# Patient Record
Sex: MALE | Race: WHITE | NOT HISPANIC OR LATINO | Employment: FULL TIME | URBAN - METROPOLITAN AREA
[De-identification: names, ages, dates, MRNs, and addresses within clinical notes are randomized per-mention and may not be internally consistent; named-entity substitution may affect disease eponyms.]

---

## 2024-04-25 ENCOUNTER — OFFICE VISIT (OUTPATIENT)
Dept: OPHTHALMOLOGY | Facility: CLINIC | Age: 24
End: 2024-04-25
Payer: COMMERCIAL

## 2024-04-25 DIAGNOSIS — H02.889 MEIBOMIAN GLAND DYSFUNCTION: Primary | ICD-10-CM

## 2024-04-25 DIAGNOSIS — H52.7 REFRACTIVE ERROR: ICD-10-CM

## 2024-04-25 PROCEDURE — 92015 DETERMINE REFRACTIVE STATE: CPT | Performed by: OPHTHALMOLOGY

## 2024-04-25 PROCEDURE — 99203 OFFICE O/P NEW LOW 30 MIN: CPT | Performed by: OPHTHALMOLOGY

## 2024-04-25 PROCEDURE — 99213 OFFICE O/P EST LOW 20 MIN: CPT | Performed by: OPHTHALMOLOGY

## 2024-04-25 ASSESSMENT — CUP TO DISC RATIO
OD_RATIO: 0.3
OS_RATIO: 0.3

## 2024-04-25 ASSESSMENT — ENCOUNTER SYMPTOMS
MUSCULOSKELETAL NEGATIVE: 0
CARDIOVASCULAR NEGATIVE: 0
NEUROLOGICAL NEGATIVE: 0
OCCASIONAL FEELINGS OF UNSTEADINESS: 0
ENDOCRINE NEGATIVE: 0
HEMATOLOGIC/LYMPHATIC NEGATIVE: 0
PSYCHIATRIC NEGATIVE: 0
GASTROINTESTINAL NEGATIVE: 0
ALLERGIC/IMMUNOLOGIC NEGATIVE: 0
DEPRESSION: 0
EYES NEGATIVE: 0
CONSTITUTIONAL NEGATIVE: 0
RESPIRATORY NEGATIVE: 0
LOSS OF SENSATION IN FEET: 0

## 2024-04-25 ASSESSMENT — REFRACTION_MANIFEST
OS_AXIS: 010
OD_SPHERE: -1.00
OS_CYLINDER: -0.75
OD_AXIS: 175
OS_SPHERE: -0.75
OD_CYLINDER: -0.50
METHOD_AUTOREFRACTION: 1

## 2024-04-25 ASSESSMENT — VISUAL ACUITY
CORRECTION_TYPE: GLASSES
OD_SC: 20/20
OS_SC+: -1
OS_SC: 20/20
METHOD: SNELLEN - SINGLE

## 2024-04-25 ASSESSMENT — PATIENT HEALTH QUESTIONNAIRE - PHQ9
1. LITTLE INTEREST OR PLEASURE IN DOING THINGS: NOT AT ALL
2. FEELING DOWN, DEPRESSED OR HOPELESS: NOT AT ALL
SUM OF ALL RESPONSES TO PHQ9 QUESTIONS 1 AND 2: 0

## 2024-04-25 ASSESSMENT — KERATOMETRY
OS_AXISANGLE_DEGREES: 100
OD_AXISANGLE_DEGREES: 90
OS_K2POWER_DIOPTERS: 43.25
OS_AXISANGLE2_DEGREES: 10
OD_AXISANGLE2_DEGREES: 180
OD_K2POWER_DIOPTERS: 43.00
METHOD_AUTO_MANUAL: AUTOMATED
OS_K1POWER_DIOPTERS: 42.25
OD_K1POWER_DIOPTERS: 42.25

## 2024-04-25 ASSESSMENT — EXTERNAL EXAM - RIGHT EYE: OD_EXAM: NORMAL

## 2024-04-25 ASSESSMENT — EXTERNAL EXAM - LEFT EYE: OS_EXAM: NORMAL

## 2024-04-25 ASSESSMENT — TONOMETRY
IOP_METHOD: GOLDMANN APPLANATION
OD_IOP_MMHG: 16
OS_IOP_MMHG: 16

## 2024-04-25 ASSESSMENT — REFRACTION_WEARINGRX
OS_CYLINDER: -0.75
OD_AXIS: 010
OS_SPHERE: -1.00
OD_SPHERE: -1.50
SPECS_TYPE: SVL
OS_AXIS: 008
OD_CYLINDER: -0.50

## 2024-04-25 ASSESSMENT — PAIN SCALES - GENERAL: PAINLEVEL: 0-NO PAIN

## 2024-04-25 ASSESSMENT — SLIT LAMP EXAM - LIDS
COMMENTS: MEIBOMIAN GLAND DYSFUNCTION
COMMENTS: MEIBOMIAN GLAND DYSFUNCTION

## 2024-04-25 NOTE — PROGRESS NOTES
Assessment/Plan   Problem List Items Addressed This Visit       Meibomian gland dysfunction - Primary     Slight meibomian gland dysfunction (MGD) tendency but asymptomatic. Will monitor for lid issues or surface discomfort. To call if any issues.          Refractive error     Discussed glasses prescription from refraction. Will provide if patient interested in keeping for records or to fill as a new set of glasses.               Provided reassurance regarding above diagnoses and care received in the office visit today. Discussed outcomes and options along with the importance of treatment compliance. Understands the importance of any follow up visits. Patient instructed to call/communicate with our office if any new issues, questions, or concerns.     Will plan to see back in 1 year full or sooner PRN

## 2024-04-25 NOTE — PATIENT INSTRUCTIONS
Thank you so much for choosing me to provide your care today!    If you were dilated your vision may remain blurry   or light sensitive for several hours.    The nature of eye and vision problems can require frequent follow up, please make every effort to adhere to any future appointments.    If you have any issues, questions, or concerns,   please do not hesitate to reach out.    If you receive a survey in regards to your care today, please mention any exceptional care my office staff and/or technicians provided.    You can reach our office at this number:  [phone]

## 2024-04-25 NOTE — ASSESSMENT & PLAN NOTE
Slight meibomian gland dysfunction (MGD) tendency but asymptomatic. Will monitor for lid issues or surface discomfort. To call if any issues.

## 2025-01-01 ENCOUNTER — HOSPITAL ENCOUNTER (EMERGENCY)
Facility: HOSPITAL | Age: 25
Discharge: HOME | End: 2025-01-01
Attending: STUDENT IN AN ORGANIZED HEALTH CARE EDUCATION/TRAINING PROGRAM
Payer: COMMERCIAL

## 2025-01-01 VITALS
BODY MASS INDEX: 42.66 KG/M2 | TEMPERATURE: 97 F | WEIGHT: 315 LBS | SYSTOLIC BLOOD PRESSURE: 150 MMHG | RESPIRATION RATE: 18 BRPM | HEIGHT: 72 IN | OXYGEN SATURATION: 98 % | DIASTOLIC BLOOD PRESSURE: 78 MMHG | HEART RATE: 93 BPM

## 2025-01-01 DIAGNOSIS — Z71.1 PHYSICALLY WELL BUT WORRIED: ICD-10-CM

## 2025-01-01 DIAGNOSIS — Z77.21 EXPOSURE TO BODY FLUID: Primary | ICD-10-CM

## 2025-01-01 LAB
HAV IGM SER QL: NONREACTIVE
HBV CORE IGM SER QL: NONREACTIVE
HBV SURFACE AG SERPL QL IA: NONREACTIVE
HCV AB SER QL: NONREACTIVE
HIV 1+2 AB+HIV1 P24 AG SERPL QL IA: NONREACTIVE

## 2025-01-01 PROCEDURE — 87389 HIV-1 AG W/HIV-1&-2 AB AG IA: CPT | Mod: TRILAB | Performed by: STUDENT IN AN ORGANIZED HEALTH CARE EDUCATION/TRAINING PROGRAM

## 2025-01-01 PROCEDURE — 36415 COLL VENOUS BLD VENIPUNCTURE: CPT | Performed by: STUDENT IN AN ORGANIZED HEALTH CARE EDUCATION/TRAINING PROGRAM

## 2025-01-01 PROCEDURE — 80074 ACUTE HEPATITIS PANEL: CPT | Mod: TRILAB | Performed by: STUDENT IN AN ORGANIZED HEALTH CARE EDUCATION/TRAINING PROGRAM

## 2025-01-01 PROCEDURE — 99283 EMERGENCY DEPT VISIT LOW MDM: CPT | Performed by: STUDENT IN AN ORGANIZED HEALTH CARE EDUCATION/TRAINING PROGRAM

## 2025-01-01 ASSESSMENT — COLUMBIA-SUICIDE SEVERITY RATING SCALE - C-SSRS
1. IN THE PAST MONTH, HAVE YOU WISHED YOU WERE DEAD OR WISHED YOU COULD GO TO SLEEP AND NOT WAKE UP?: NO
6. HAVE YOU EVER DONE ANYTHING, STARTED TO DO ANYTHING, OR PREPARED TO DO ANYTHING TO END YOUR LIFE?: NO
2. HAVE YOU ACTUALLY HAD ANY THOUGHTS OF KILLING YOURSELF?: NO

## 2025-01-01 ASSESSMENT — PAIN SCALES - GENERAL: PAINLEVEL_OUTOF10: 0 - NO PAIN

## 2025-01-01 ASSESSMENT — PAIN - FUNCTIONAL ASSESSMENT: PAIN_FUNCTIONAL_ASSESSMENT: 0-10
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
## 2025-01-01 NOTE — DISCHARGE INSTRUCTIONS
Thank you for allowing myself and the team to take care of you during your emergency situation and addressing your health concerns.    You were evaluated for exposure to bodily fluids.     Your likely condition/diagnosis: Low concern for inoculation of infectious pathogen    During your visit in the emergency department you were evaluated for your presenting symptoms.  Based on the extensive workup you received,  all efforts were made to identify the source of your symptoms and identify any life-threatening conditions.  These life-threatening conditions that were attempted to be identified and medically managed/treated include but not limited to none.  Additionally, you may be experiencing symptoms that are non-life-threatening but are uncomfortable and disruptive to your everyday life.  All efforts were made to manage your symptoms while in the emergency department along with appropriate at home therapy for symptomatic management during your recovery. Please be aware that not all of the conditions explained/discussed in these discharge instructions are applicable to your condition but encompass many of the conditions that were evaluated for during your ED encounter.      During your evaluation and assessment, all measures were taken to evaluate you and address your health concerns to identify dangerous and life threatening health conditions. It is not possible to identify all health conditions or pathologies and eliminate any chance of unfavorable outcomes while in the Emergency Department. My team encourages you to be vigilant with your health and follow-up with the appropriate providers outlined in your discharge paperwork. Please return to the Emergency Department if you feel that your health has not improved or experiencing worsening symptoms.    Special instructions please follow your results on [employer].  Please make follow-up appoint with your primary care physician to further manage symptoms address your  health concerns.    Incidental findings:  None

## 2025-01-02 ENCOUNTER — DOCUMENTATION (OUTPATIENT)
Dept: EMERGENCY MEDICINE | Facility: HOSPITAL | Age: 25
End: 2025-01-02
Payer: COMMERCIAL

## 2025-01-02 NOTE — PROGRESS NOTES
1/2/2025     Test:   Discussed HIV and HCV testing with the patient in the ED.   Patient received HIV and HCV test today    Test Result:  HIV Negative  HCV Negative  SYPHILIS Indeterminate    Result notification:  Patient was notified of their test results on 01/02/25 via Telephone (after verifying 3 identifiers)    Follow-up plan:   Patient was referred to Other on [Date]  Patient has appointment at [clinic name] on [Date]  Barriers to attending appointment: [free text, none]  Missed appointments: [unfilled]     Signed  JESSICA Romo   HIV/HCV [facility]  Certified Community Health Worker - Research  Please contact me via email or Secure Chat or phone [phone] with questions

## 2025-02-12 ENCOUNTER — OFFICE VISIT (OUTPATIENT)
Dept: URGENT CARE | Age: 25
End: 2025-02-12
Payer: COMMERCIAL

## 2025-02-12 VITALS
DIASTOLIC BLOOD PRESSURE: 92 MMHG | SYSTOLIC BLOOD PRESSURE: 137 MMHG | OXYGEN SATURATION: 99 % | HEART RATE: 91 BPM | HEIGHT: 72 IN | BODY MASS INDEX: 41.61 KG/M2 | WEIGHT: 307.2 LBS | TEMPERATURE: 99.3 F

## 2025-02-12 DIAGNOSIS — Z20.822 COVID-19 RULED OUT: ICD-10-CM

## 2025-02-12 DIAGNOSIS — J10.1 INFLUENZA A: ICD-10-CM

## 2025-02-12 DIAGNOSIS — R68.89 FLU-LIKE SYMPTOMS: Primary | ICD-10-CM

## 2025-02-12 LAB
POC RAPID INFLUENZA A: POSITIVE
POC RAPID INFLUENZA B: NEGATIVE
POC SARS-COV-2 AG BINAX: NORMAL

## 2025-02-12 ASSESSMENT — ENCOUNTER SYMPTOMS
FEVER: 1
HEADACHES: 1
COUGH: 1

## 2025-02-12 ASSESSMENT — PAIN SCALES - GENERAL: PAINLEVEL_OUTOF10: 2

## 2025-02-12 NOTE — LETTER
February 12, 2025     Patient: Lan Salinas   YOB: 2000   Date of Visit: 2/12/2025       To Whom It May Concern:    It is my medical opinion that Lan Salinas may return to work on 02/13/2025 .    If you have any questions or concerns, please don't hesitate to call.         Sincerely,        Zi Luciano PA-C    CC: No Recipients

## 2025-02-12 NOTE — LETTER
February 12, 2025     Patient: Lan Salinas   YOB: 2000   Date of Visit: 2/12/2025       To Whom It May Concern:    It is my medical opinion that Lan Salinas may return to work on 02/14/2025 .    If you have any questions or concerns, please don't hesitate to call.         Sincerely,        Zi Luciano PA-C    CC: No Recipients

## 2025-02-12 NOTE — PROGRESS NOTES
Subjective   Patient ID: Lan Salinas is a 24 y.o. male. They present today with a chief complaint of Cough, Nasal Congestion, Fever, Generalized Body Aches, Chills, Illness (Sx x 2-3 d.), and Headache.    History of Present Illness  Patient is a pleasant 24-year-old white male, no significant past medical history, presented to clinic for complaint of flulike symptoms.  Patient is reporting 2 to 3-day history of upper respiratory congestion, rhinorrhea, body aches, chills, and headache.  States he has been using DayQuil and NyQuil at home with good short-term relief of the symptoms.  Denies any chest pain or shortness of breath.  No abdominal pain, nausea, vomiting.  No numbness tingling or focal weakness.  He has no further complaints at this time.  States he is eating and drinking well without difficulty.      Cough  Associated symptoms include a fever and headaches.   Fever   Associated symptoms include coughing and headaches.   Illness  Associated symptoms: cough, fever and headaches    Headache  Associated symptoms: cough and fever        Past Medical History  Allergies as of 02/12/2025    (No Known Allergies)       (Not in a hospital admission)         No past medical history on file.    No past surgical history on file.     reports that he has been smoking cigarettes and cigars. He has never used smokeless tobacco. He reports current alcohol use. He reports that he does not currently use drugs.    Review of Systems  Review of Systems   Constitutional:  Positive for fever.   Respiratory:  Positive for cough.    Neurological:  Positive for headaches.                                  Objective    Vitals:    02/12/25 1310   BP: (!) 137/92   BP Location: Left arm   Patient Position: Sitting   BP Cuff Size: Large adult   Pulse: 91   Temp: 37.4 °C (99.3 °F)   TempSrc: Oral   SpO2: 99%   Weight: 139 kg (307 lb 3.2 oz)   Height: 1.829 m (6')     No LMP for male patient.    Physical Exam  General: Vitals Noted. No  distress. Normocephalic.     HEENT: TMs normal, EOMI, normal conjunctiva, patent nares with erythematous edematous and appear nasal turbinates and clear rhinorrhea bilaterally.  Posterior oropharynx with signs of postnasal drainage without any erythema swelling or tonsillar exudate.  Uvula is in the midline and nonedematous.  No drooling.  No trismus.    Neck: Supple with no adenopathy.     Cardiac: Regular Rate and Rhythm. No murmur.     Pulmonary: Equal breath sounds bilaterally. No wheezes, rhonchi, or rales.    Abdomen: Soft, non-tender, with normal bowel sounds.     Musculoskeletal: Moves all extremities, no effusion, no edema.     Skin: No obvious rashes.  Procedures    Point of Care Test & Imaging Results from this visit  Results for orders placed or performed in visit on 02/12/25   POCT [facility] Covid-19 Ag Card manually resulted    Collection Time: 02/12/25  1:28 PM   Result Value Ref Range    POC LANRE-COV-2 AG  Presumptive negative test for SARS-CoV-2 (no antigen detected)     Presumptive negative test for SARS-CoV-2 (no antigen detected)   POCT Influenza A/B manually resulted    Collection Time: 02/12/25  1:28 PM   Result Value Ref Range    POC Rapid Influenza A Positive (A) Negative    POC Rapid Influenza B Negative Negative      No results found.    Diagnostic study results (if any) were reviewed by Zi Luciano PA-C.    Assessment/Plan   Allergies, medications, history, and pertinent labs/EKGs/Imaging reviewed by Zi Luciano PA-C.     Medical Decision Making  Patient was seen eval in the clinic for complaint of flulike symptoms.  On exam patient is nontoxic very well-appearing respite comfortably no acute distress.  Vital signs are stable, afebrile.  Chest clear, regular, belly soft and nontender.  ENT exam as above consistent with a viral illness.  Rapid COVID-19 and influenza testing were performed returned positive for influenza A.  Will advise supportive care measures at home  including plenty of fluids, rest, Tylenol or Profen as needed, close short-term follow-up with his primary care physician in the next week.  We discharged home at this time.  I reviewed my impression, plan, strict return versus report to ED precautions with the patient.  He expresses understanding and agreement plan of care.      Orders and Diagnoses  Diagnoses and all orders for this visit:  Flu-like symptoms  -     POCT Influenza A/B manually resulted  COVID-19 ruled out  -     POCT [facility]NOW Covid-19 Ag Card manually resulted        Medical Admin Record      Follow Up Instructions  No follow-ups on file.    Patient disposition: Home    Electronically signed by Zi Luciano PA-C  1:44 PM

## 2025-04-28 ENCOUNTER — APPOINTMENT (OUTPATIENT)
Dept: OPHTHALMOLOGY | Facility: CLINIC | Age: 25
End: 2025-04-28
Payer: COMMERCIAL

## 2025-04-28 DIAGNOSIS — H02.88A MEIBOMIAN GLAND DYSFUNCTION (MGD) OF UPPER AND LOWER LIDS OF BOTH EYES: Primary | ICD-10-CM

## 2025-04-28 DIAGNOSIS — H52.7 REFRACTIVE ERROR: ICD-10-CM

## 2025-04-28 DIAGNOSIS — H02.88B MEIBOMIAN GLAND DYSFUNCTION (MGD) OF UPPER AND LOWER LIDS OF BOTH EYES: Primary | ICD-10-CM

## 2025-04-28 PROCEDURE — 99213 OFFICE O/P EST LOW 20 MIN: CPT | Performed by: OPHTHALMOLOGY

## 2025-04-28 PROCEDURE — 92015 DETERMINE REFRACTIVE STATE: CPT | Performed by: OPHTHALMOLOGY

## 2025-04-28 ASSESSMENT — VISUAL ACUITY
OD_CC: 20/20
METHOD: SNELLEN - SINGLE
CORRECTION_TYPE: GLASSES
OS_CC: 20/20

## 2025-04-28 ASSESSMENT — PATIENT HEALTH QUESTIONNAIRE - PHQ9
1. LITTLE INTEREST OR PLEASURE IN DOING THINGS: NOT AT ALL
SUM OF ALL RESPONSES TO PHQ9 QUESTIONS 1 AND 2: 0
2. FEELING DOWN, DEPRESSED OR HOPELESS: NOT AT ALL

## 2025-04-28 ASSESSMENT — KERATOMETRY
OS_K2POWER_DIOPTERS: 43.00
OS_AXISANGLE_DEGREES: 95
OD_K1POWER_DIOPTERS: 42.00
OD_AXISANGLE_DEGREES: 90
OD_K2POWER_DIOPTERS: 43.00
OS_K1POWER_DIOPTERS: 41.75
OS_AXISANGLE2_DEGREES: 5
OD_AXISANGLE2_DEGREES: 180
METHOD_AUTO_MANUAL: AUTOMATED

## 2025-04-28 ASSESSMENT — REFRACTION_WEARINGRX
SPECS_TYPE: SVL
OD_CYLINDER: -0.50
OS_SPHERE: -1.00
OS_AXIS: 008
OS_CYLINDER: -0.50
OD_SPHERE: -1.50
OD_AXIS: 172

## 2025-04-28 ASSESSMENT — REFRACTION_MANIFEST
METHOD_AUTOREFRACTION: 1
OS_CYLINDER: -0.75
OD_CYLINDER: -0.75
OD_CYLINDER: -0.75
OD_AXIS: 175
OS_AXIS: 010
OD_SPHERE: -1.00
OS_CYLINDER: -0.75
OS_SPHERE: -0.50
OS_SPHERE: -1.00
OD_SPHERE: -1.25
OS_AXIS: 015
OD_AXIS: 170

## 2025-04-28 ASSESSMENT — EXTERNAL EXAM - LEFT EYE: OS_EXAM: NORMAL

## 2025-04-28 ASSESSMENT — ENCOUNTER SYMPTOMS
MUSCULOSKELETAL NEGATIVE: 0
EYES NEGATIVE: 0
ALLERGIC/IMMUNOLOGIC NEGATIVE: 0
GASTROINTESTINAL NEGATIVE: 0
CONSTITUTIONAL NEGATIVE: 0
RESPIRATORY NEGATIVE: 0
HEMATOLOGIC/LYMPHATIC NEGATIVE: 0
CARDIOVASCULAR NEGATIVE: 0
ENDOCRINE NEGATIVE: 0
PSYCHIATRIC NEGATIVE: 0
NEUROLOGICAL NEGATIVE: 0

## 2025-04-28 ASSESSMENT — TONOMETRY
IOP_METHOD: GOLDMANN APPLANATION
OS_IOP_MMHG: 12
OD_IOP_MMHG: 12

## 2025-04-28 ASSESSMENT — SLIT LAMP EXAM - LIDS
COMMENTS: MEIBOMIAN GLAND DYSFUNCTION
COMMENTS: MEIBOMIAN GLAND DYSFUNCTION

## 2025-04-28 ASSESSMENT — CUP TO DISC RATIO
OD_RATIO: 0.3
OS_RATIO: 0.3

## 2025-04-28 ASSESSMENT — EXTERNAL EXAM - RIGHT EYE: OD_EXAM: NORMAL

## 2025-04-28 NOTE — LETTER
April 28, 2025     Patient: Lan Salinas   YOB: 2000   Date of Visit: 4/28/2025       To Whom It May Concern:    It is my medical opinion that Lan Salinas may return to work on 4/29/25 .    If you have any questions or concerns, please don't hesitate to call.         Sincerely,        Austin Gutierres MD    CC: No Recipients

## 2025-04-28 NOTE — PATIENT INSTRUCTIONS
Thank you so much for choosing me to provide your care today!    If you were dilated your vision may remain blurry   or light sensitive for several hours.    The nature of eye and vision problems can require frequent follow up, please make every effort to adhere to any future appointments.    If you have any issues, questions, or concerns,   please do not hesitate to reach out.    If you receive a survey in regards to your care today, please mention any exceptional care my office staff and/or technicians provided.    You can reach our office at this number:    [phone]    Please consider signing up for and utilizing [employer]!  This is the best way to directly reach me or other  providers

## 2025-04-28 NOTE — ASSESSMENT & PLAN NOTE
Unclear if any refractive error playing role in night vision issues. Advised if struggling more and lubrication provides no benefit to call. New Rx for glasses/SCL given per patient request. Patient's signature obtained to acknowledge and confirm that a paper copy of glasses/SCL Rx was given to patient in compliance with [facility] Eyeglass Rule. Electronic copy of Rx will also be available via [employer].

## 2025-04-28 NOTE — ASSESSMENT & PLAN NOTE
No obvious evaporative dry eye, ? Light sensitivity relationship. Advised could trial some lubrication as needed.

## 2025-04-28 NOTE — PROGRESS NOTES
Assessment/Plan   Problem List Items Addressed This Visit       Meibomian gland dysfunction - Primary    No obvious evaporative dry eye, ? Light sensitivity relationship. Advised could trial some lubrication as needed.          Refractive error    Unclear if any refractive error playing role in night vision issues. Advised if struggling more and lubrication provides no benefit to call. New Rx for glasses/SCL given per patient request. Patient's signature obtained to acknowledge and confirm that a paper copy of glasses/SCL Rx was given to patient in compliance with [facility] Eyeglass Rule. Electronic copy of Rx will also be available via [employer].               Provided reassurance regarding above diagnoses and care received in the office visit today. Discussed outcomes and options along with the importance of treatment compliance. Understands the importance of any follow up visits. Patient instructed to call/communicate with our office if any new issues, questions, or concerns.     Will plan to see back in 2 year full or sooner PRN